# Patient Record
Sex: FEMALE | Race: BLACK OR AFRICAN AMERICAN
[De-identification: names, ages, dates, MRNs, and addresses within clinical notes are randomized per-mention and may not be internally consistent; named-entity substitution may affect disease eponyms.]

---

## 2019-08-06 ENCOUNTER — HOSPITAL ENCOUNTER (OUTPATIENT)
Dept: HOSPITAL 88 - MAMMO | Age: 44
End: 2019-08-06
Attending: INTERNAL MEDICINE
Payer: COMMERCIAL

## 2019-08-06 DIAGNOSIS — M54.5: ICD-10-CM

## 2019-08-06 DIAGNOSIS — Z12.31: Primary | ICD-10-CM

## 2019-08-06 PROCEDURE — 77067 SCR MAMMO BI INCL CAD: CPT

## 2019-08-06 PROCEDURE — 72110 X-RAY EXAM L-2 SPINE 4/>VWS: CPT

## 2019-08-06 NOTE — DIAGNOSTIC IMAGING REPORT
EXAMINATION:  SP LUMBAR, COMPLETE MIN 4VW    



INDICATION: Back pain



COMPARISON: None

     

FINDINGS:



No compression fracture. Alignment is anatomic. Degenerative changes centered

at L4-5 with disc space narrowing, osteophyte formation, and mild facet

arthropathy. Nonobstructive bowel gas pattern. No free air.



IMPRESSION: 

No compression fracture. Mild lower lumbar spine degenerative changes centered

at L4-5.



Signed by: Pat Patterson MD on 8/6/2019 11:35 AM

## 2019-08-16 NOTE — DIAGNOSTIC IMAGING REPORT
#[id number] - [id number]

#BILATERAL DIGITAL SCREENING MAMMOGRAM WITH CAD: 8/6/2019

CLINICAL: Routine screening.  



No prior exams were available for comparison.  

The tissue of both breasts is predominantly fatty.  

Current study was also evaluated with a Computer Aided Detection (CAD) system.  

Benign appearing coarse calcifications are noted bilaterally in the retroareolar regions.  

No significant masses, calcifications, or other findings are seen in either breast.  



IMPRESSION: BENIGN

There is no mammographic evidence of malignancy.  A 1 year screening mammogram is recommended. 

The patient will be notified by letter of the results.  





CHERELLE REDMOND M.D.          

ct/penrad:8/15/2019 09:48:45  



Imaging Technologist: Mer ARTIS(R)([place]), [facility]

letter sent: Normal Exam  

Mammogram BI-RADS: 2 Benign